# Patient Record
Sex: FEMALE | Race: WHITE | URBAN - NONMETROPOLITAN AREA
[De-identification: names, ages, dates, MRNs, and addresses within clinical notes are randomized per-mention and may not be internally consistent; named-entity substitution may affect disease eponyms.]

---

## 2015-12-03 LAB
CHOLESTEROL, TOTAL: 169 MG/DL
CHOLESTEROL/HDL RATIO: 1.8
GLUCOSE BLD-MCNC: 97 MG/DL
HDLC SERPL-MCNC: 91 MG/DL (ref 35–70)
LDL CHOLESTEROL CALCULATED: 62 MG/DL (ref 0–160)
TRIGL SERPL-MCNC: 65 MG/DL
VLDLC SERPL CALC-MCNC: 13 MG/DL

## 2017-11-18 ENCOUNTER — OFFICE VISIT (OUTPATIENT)
Dept: FAMILY MEDICINE CLINIC | Age: 47
End: 2017-11-18
Payer: COMMERCIAL

## 2017-11-18 VITALS — WEIGHT: 133 LBS | SYSTOLIC BLOOD PRESSURE: 126 MMHG | DIASTOLIC BLOOD PRESSURE: 70 MMHG | HEART RATE: 80 BPM

## 2017-11-18 DIAGNOSIS — R21 RASH: Primary | ICD-10-CM

## 2017-11-18 PROCEDURE — 99213 OFFICE O/P EST LOW 20 MIN: CPT | Performed by: FAMILY MEDICINE

## 2017-11-18 RX ORDER — PERMETHRIN 50 MG/G
CREAM TOPICAL
Qty: 1 TUBE | Refills: 0 | Status: SHIPPED | OUTPATIENT
Start: 2017-11-18 | End: 2019-10-15

## 2017-11-18 NOTE — PATIENT INSTRUCTIONS
oatmeal bath product, such as Aveeno. When should you call for help? Call your doctor now or seek immediate medical care if:  · You have signs of infection, such as:  ¨ Increased pain, swelling, warmth, or redness. ¨ Red streaks leading from the mite bites. ¨ Pus draining from a bite area. ¨ A fever. Watch closely for changes in your health, and be sure to contact your doctor if:  · Anyone else in your family has itching. · You do not get better within 2 weeks. Where can you learn more? Go to [email or web address] and sign in to your [email or web address] account. Enter F141 in the [facility] box to learn more about \"Scabies: Care Instructions. \"     If you do not have an account, please click on the \"Sign Up Now\" link. Current as of: October 13, 2016  Content Version: 11.3  © 8104-0372 [employer], [employer]. Care instructions adapted under license by [facility] ([facility]). If you have questions about a medical condition or this instruction, always ask your healthcare professional. [place] [address] any warranty or liability for your use of this information.

## 2017-11-18 NOTE — PROGRESS NOTES
[address] [place] [address] [address] [place] [address] [place] [address]  Dept: [phone]  Dept Fax: [phone]      Padmini Triplett is a 52 y.o. female who presents today for her medical conditions/complaints as noted below. Chief Complaint   Patient presents with    Rash       HPI:     HPI   Has noted rash only for couple days. Student exposed was treated over 10 days ago. Exposure for ringworm or scabies. Current Outpatient Prescriptions   Medication Sig Dispense Refill    permethrin (ELIMITE) 5 % cream Apply topically as directed 1 Tube 0     No current facility-administered medications for this visit. No Known Allergies    Subjective:      Review of Systems   Constitutional: Negative for fever. 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 Objective:     /70   Pulse 80   Wt 133 lb (60.3 kg)   Physical Exam   Constitutional: She is oriented to person, place, and time. She appears well-developed and well-nourished. No distress. Pulmonary/Chest: Effort normal.   Neurological: She is alert and oriented to person, place, and time. Skin: Rash (abdomen with 2 linear lesions without vessicles. No lesions on hands, no burrows noted. ) noted. Assessment:   The encounter diagnosis was Rash. Plan:     Patient Instructions   May attempt OTC zanfel to ensure not contact dermatitis related rashPatient Education        Scabies: Care Instructions  Your Care Instructions  Scabies is a skin problem that can cause intense itching. It is caused by very tiny bugs called mites that dig just under the skin and lay eggs. An allergic reaction to the mites causes the itching. Scabies is usually spread by person-to-person contact.  It is also possible, but not common, for scabies to spread within 2 weeks. Where can you learn more? Go to https://chpepiceweb.[email or web address] org and sign in to your [employer] account. Enter G497 in the [facility] box to learn more about \"Scabies: Care Instructions. \"     If you do not have an account, please click on the \"Sign Up Now\" link. Current as of: October 13, 2016  Content Version: 11.3  © 2254-1042 LOVEFiLM, YottaMark. Care instructions adapted under license by [facility] ([facility]). If you have questions about a medical condition or this instruction, always ask your healthcare professional. [place] [address] any warranty or liability for your use of this information. No Follow-up on file. No orders of the defined types were placed in this encounter.     Orders Placed This Encounter   Medications    permethrin (ELIMITE) 5 % cream     Sig: Apply topically as directed     Dispense:  1 Tube     Refill:  0          Electronically signed by Chana George MD on 11/18/2017 at 10:05 AM

## 2017-12-14 VITALS
SYSTOLIC BLOOD PRESSURE: 124 MMHG | BODY MASS INDEX: 21.66 KG/M2 | DIASTOLIC BLOOD PRESSURE: 70 MMHG | WEIGHT: 126.9 LBS | HEART RATE: 70 BPM | HEIGHT: 64 IN

## 2017-12-14 PROBLEM — D50.9 IRON DEFICIENCY ANEMIA: Status: ACTIVE | Noted: 2017-12-14

## 2017-12-14 RX ORDER — FERROUS SULFATE 325(65) MG
325 TABLET ORAL
COMMUNITY
End: 2018-02-05 | Stop reason: ALTCHOICE

## 2017-12-14 RX ORDER — SULFAMETHOXAZOLE AND TRIMETHOPRIM 400; 80 MG/1; MG/1
1 TABLET ORAL 2 TIMES DAILY
COMMUNITY
End: 2018-02-05 | Stop reason: ALTCHOICE

## 2018-01-25 DIAGNOSIS — Z12.31 ENCOUNTER FOR SCREENING MAMMOGRAM FOR MALIGNANT NEOPLASM OF BREAST: Primary | ICD-10-CM

## 2018-02-05 ENCOUNTER — OFFICE VISIT (OUTPATIENT)
Dept: FAMILY MEDICINE CLINIC | Age: 48
End: 2018-02-05
Payer: COMMERCIAL

## 2018-02-05 VITALS
DIASTOLIC BLOOD PRESSURE: 76 MMHG | HEART RATE: 64 BPM | SYSTOLIC BLOOD PRESSURE: 124 MMHG | BODY MASS INDEX: 23.54 KG/M2 | WEIGHT: 135 LBS

## 2018-02-05 DIAGNOSIS — Z01.419 WELL FEMALE EXAM WITH ROUTINE GYNECOLOGICAL EXAM: Primary | ICD-10-CM

## 2018-02-05 DIAGNOSIS — Z23 NEED FOR TETANUS BOOSTER: ICD-10-CM

## 2018-02-05 DIAGNOSIS — Z01.419 PAP SMEAR, LOW-RISK: ICD-10-CM

## 2018-02-05 LAB — PAP AGE BASED: NORMAL

## 2018-02-05 PROCEDURE — 90471 IMMUNIZATION ADMIN: CPT | Performed by: FAMILY MEDICINE

## 2018-02-05 PROCEDURE — 90715 TDAP VACCINE 7 YRS/> IM: CPT | Performed by: FAMILY MEDICINE

## 2018-02-05 PROCEDURE — 99396 PREV VISIT EST AGE 40-64: CPT | Performed by: FAMILY MEDICINE

## 2018-02-05 ASSESSMENT — PATIENT HEALTH QUESTIONNAIRE - PHQ9
SUM OF ALL RESPONSES TO PHQ9 QUESTIONS 1 & 2: 0
SUM OF ALL RESPONSES TO PHQ QUESTIONS 1-9: 0
1. LITTLE INTEREST OR PLEASURE IN DOING THINGS: 0
2. FEELING DOWN, DEPRESSED OR HOPELESS: 0

## 2018-02-05 NOTE — PROGRESS NOTES
shortness of breath and wheezing. Cardiovascular: Negative for chest pain, palpitations and leg swelling. Gastrointestinal: Negative for abdominal pain, blood in stool, constipation and diarrhea. Genitourinary: Negative for dysuria, hematuria, pelvic pain, urgency, vaginal bleeding, vaginal discharge and vaginal pain. Hematological: Negative for adenopathy. Objective:     /76   Pulse 64   Wt 135 lb (61.2 kg)   LMP 01/22/2018 (Exact Date)   Breastfeeding? No   BMI 23.54 kg/m²     Physical Exam   Constitutional: She appears well-developed and well-nourished. HENT:   Head: Normocephalic. Right Ear: Tympanic membrane normal.   Left Ear: Tympanic membrane normal.   Nose: Nose normal.   Mouth/Throat: No oropharyngeal exudate or posterior oropharyngeal erythema. Neck: Neck supple. Carotid bruit is not present. No thyromegaly present. Cardiovascular: Normal rate, regular rhythm, S1 normal and S2 normal.    No murmur heard. Pulmonary/Chest: Breath sounds normal. She has no wheezes. She has no rhonchi. She has no rales. She exhibits no tenderness. Abdominal: Soft. There is no hepatosplenomegaly. There is no tenderness. Musculoskeletal: She exhibits no edema. Lymphadenopathy:     She has no cervical adenopathy. She has no axillary adenopathy. Vitals reviewed. Breast Exam:  There is no asymmetry, edema, dimpling, scars, nipple retraction or discharge. Bilateral exam shows no dominant or suspicious lesions. Cervical and supraclavicular exam normal.  No axillary adenopathy. Gynecologic Exam:     External genitalia normal.  Vaginal exam:normal mucosa without prolapse or lesions  Cervical exam:  multiparous appearance    Uterine exam: retroverted    Assessment:     1. Well female exam with routine gynecological exam  PAP SMEAR   2. Pap smear, low-risk  PAP SMEAR   3.  Need for tetanus booster  Tdap (ADACEL) 5-2-15.5 LF-MCG/0.5 injection    Tdap (age 10y-63y) IM (ADACEL) Plan:       Repeat pap every 3 - 5 years if negative for women over the age of 27. Every 3 years under 27years old. Mammograms every 1 year. If 37 yo and last mammogram was negative. Calcium and Vitamin D dosing reviewed. Colonoscopy screening reviewed as well as onset for bone density testing.          Electronically signed by Katalina Coker MD on 2/6/2018

## 2018-02-06 ASSESSMENT — ENCOUNTER SYMPTOMS
SORE THROAT: 0
WHEEZING: 0
SHORTNESS OF BREATH: 0
ABDOMINAL PAIN: 0
BLOOD IN STOOL: 0
DIARRHEA: 0
CONSTIPATION: 0

## 2018-02-13 DIAGNOSIS — Z01.419 WELL FEMALE EXAM WITH ROUTINE GYNECOLOGICAL EXAM: ICD-10-CM

## 2018-02-13 DIAGNOSIS — Z01.419 PAP SMEAR, LOW-RISK: ICD-10-CM

## 2019-01-23 ENCOUNTER — TELEPHONE (OUTPATIENT)
Dept: FAMILY MEDICINE CLINIC | Age: 49
End: 2019-01-23

## 2019-01-23 DIAGNOSIS — Z12.31 SCREENING MAMMOGRAM, ENCOUNTER FOR: Primary | ICD-10-CM

## 2019-10-07 LAB
ALBUMIN SERPL-MCNC: 4.6 G/DL
ALP BLD-CCNC: 76 U/L
ALT SERPL-CCNC: 11 U/L
ANION GAP SERPL CALCULATED.3IONS-SCNC: 1.6 MMOL/L
AST SERPL-CCNC: 14 U/L
BILIRUB SERPL-MCNC: 0.4 MG/DL (ref 0.1–1.4)
BUN BLDV-MCNC: 10 MG/DL
CALCIUM SERPL-MCNC: 9.4 MG/DL
CHLORIDE BLD-SCNC: 98 MMOL/L
CHOLESTEROL, TOTAL: 162 MG/DL
CHOLESTEROL/HDL RATIO: 1.7
CO2: 98 MMOL/L
CREAT SERPL-MCNC: 0.62 MG/DL
GFR CALCULATED: 103
GLUCOSE BLD-MCNC: 98 MG/DL
GLUCOSE FASTING: 98 MG/DL
HDLC SERPL-MCNC: 93 MG/DL (ref 35–70)
LDL CHOLESTEROL CALCULATED: 58 MG/DL (ref 0–160)
POTASSIUM SERPL-SCNC: 4.2 MMOL/L
SODIUM BLD-SCNC: 137 MMOL/L
TOTAL PROTEIN: 7.4
TRIGL SERPL-MCNC: 57 MG/DL
VLDLC SERPL CALC-MCNC: 11 MG/DL

## 2019-10-11 DIAGNOSIS — D50.9 IRON DEFICIENCY ANEMIA, UNSPECIFIED IRON DEFICIENCY ANEMIA TYPE: Primary | ICD-10-CM

## 2019-10-15 ENCOUNTER — OFFICE VISIT (OUTPATIENT)
Dept: FAMILY MEDICINE CLINIC | Age: 49
End: 2019-10-15
Payer: COMMERCIAL

## 2019-10-15 VITALS
HEART RATE: 84 BPM | BODY MASS INDEX: 21.97 KG/M2 | DIASTOLIC BLOOD PRESSURE: 84 MMHG | WEIGHT: 126 LBS | SYSTOLIC BLOOD PRESSURE: 126 MMHG | OXYGEN SATURATION: 100 %

## 2019-10-15 DIAGNOSIS — D64.9 ANEMIA, UNSPECIFIED TYPE: Primary | ICD-10-CM

## 2019-10-15 DIAGNOSIS — D33.3 LEFT ACOUSTIC NEUROMA (HCC): ICD-10-CM

## 2019-10-15 PROCEDURE — 99214 OFFICE O/P EST MOD 30 MIN: CPT | Performed by: FAMILY MEDICINE

## 2019-10-15 ASSESSMENT — ENCOUNTER SYMPTOMS
ABDOMINAL PAIN: 0
COUGH: 0
SHORTNESS OF BREATH: 0
BLOOD IN STOOL: 0
CONSTIPATION: 0
DIARRHEA: 0
WHEEZING: 0

## 2019-10-15 ASSESSMENT — PATIENT HEALTH QUESTIONNAIRE - PHQ9
SUM OF ALL RESPONSES TO PHQ QUESTIONS 1-9: 0
2. FEELING DOWN, DEPRESSED OR HOPELESS: 0
SUM OF ALL RESPONSES TO PHQ QUESTIONS 1-9: 0
SUM OF ALL RESPONSES TO PHQ9 QUESTIONS 1 & 2: 0
1. LITTLE INTEREST OR PLEASURE IN DOING THINGS: 0

## 2019-11-19 ENCOUNTER — OFFICE VISIT (OUTPATIENT)
Dept: FAMILY MEDICINE CLINIC | Age: 49
End: 2019-11-19
Payer: COMMERCIAL

## 2019-11-19 VITALS
HEIGHT: 64 IN | DIASTOLIC BLOOD PRESSURE: 74 MMHG | BODY MASS INDEX: 21.29 KG/M2 | WEIGHT: 124.7 LBS | OXYGEN SATURATION: 99 % | SYSTOLIC BLOOD PRESSURE: 130 MMHG | HEART RATE: 66 BPM

## 2019-11-19 DIAGNOSIS — D50.9 IRON DEFICIENCY ANEMIA, UNSPECIFIED IRON DEFICIENCY ANEMIA TYPE: ICD-10-CM

## 2019-11-19 DIAGNOSIS — D64.9 ANEMIA, UNSPECIFIED TYPE: ICD-10-CM

## 2019-11-19 DIAGNOSIS — D33.3 LEFT ACOUSTIC NEUROMA (HCC): ICD-10-CM

## 2019-11-19 DIAGNOSIS — D50.9 IRON DEFICIENCY ANEMIA, UNSPECIFIED IRON DEFICIENCY ANEMIA TYPE: Primary | ICD-10-CM

## 2019-11-19 DIAGNOSIS — R42 VERTIGO: ICD-10-CM

## 2019-11-19 LAB — FERRITIN: NORMAL

## 2019-11-19 PROCEDURE — 99213 OFFICE O/P EST LOW 20 MIN: CPT | Performed by: FAMILY MEDICINE

## 2019-11-19 RX ORDER — FERROUS SULFATE 325(65) MG
325 TABLET ORAL 2 TIMES DAILY
COMMUNITY

## 2019-11-19 ASSESSMENT — ENCOUNTER SYMPTOMS
SHORTNESS OF BREATH: 0
BLOOD IN STOOL: 0
WHEEZING: 0
COUGH: 0
CONSTIPATION: 0
DIARRHEA: 0
ABDOMINAL PAIN: 0

## 2019-12-30 ENCOUNTER — TELEPHONE (OUTPATIENT)
Dept: FAMILY MEDICINE CLINIC | Age: 49
End: 2019-12-30

## 2019-12-30 DIAGNOSIS — Z12.31 SCREENING MAMMOGRAM, ENCOUNTER FOR: Primary | ICD-10-CM

## 2021-03-08 ENCOUNTER — TELEPHONE (OUTPATIENT)
Dept: SURGERY | Age: 51
End: 2021-03-08

## 2021-03-08 DIAGNOSIS — Z01.818 PRE-OP TESTING: Primary | ICD-10-CM

## 2021-03-08 RX ORDER — POLYETHYLENE GLYCOL 3350, SODIUM CHLORIDE, POTASSIUM CHLORIDE, SODIUM BICARBONATE, AND SODIUM SULFATE 240; 5.84; 2.98; 6.72; 22.72 G/4L; G/4L; G/4L; G/4L; G/4L
4000 POWDER, FOR SOLUTION ORAL ONCE
Qty: 4000 ML | Refills: 0 | Status: SHIPPED | OUTPATIENT
Start: 2021-03-08 | End: 2021-03-08

## 2021-03-08 NOTE — TELEPHONE ENCOUNTER
[facility] Colonoscopy Worksheet    Patient Name: Harpreet Yoder  : 1970  Primary Care Physician: Bharti Yuen MD  Today's Date: 3/8/2021    Surgery Location:   [][place] [] [place] [x] [facility] [place] [] [facility]    Why has a Colonoscopy been recommended for you? Screening    To properly code your procedure we must ask the following questions. PLEASE CHECK ALL THAT APPLY. Are you currently having any of the following symptoms? [] Rectal Bleeding (K62.5) [] Bloody Stool (K92.1) [] Dark Tarry Stool (K92.1)  [] Fecal Occult Positive Blood (confirmed by blood test R19.5)  [] Anemia (low hemoglobin D64.9) [] Abdominal Pain (R10.84) [] Diarrhea (R19.7)    **If you have any of these symptoms your colonoscopy is diagnostic and must be coded as such. It will not be coded as a screening colonoscopy. If you have no symptoms but have a personal history of polyps or a family history of colon cancer you fall in the high risk-screening category and we need to know more information. If you have had a polyp or polyps removed at your last colonoscopy then the first scope after that is considered diagnostic. Also if you have irritable bowel syndrome Chron's disease, diverticulitis or colon cancer then the scope would be a diagnostic colonoscopy. Have you ever had a colonoscopy? [] Yes  [x] No    If so, where and when was that done? Was anything found during the last scope? Was it removed? Do you have a family history of colon cancer? no      Have you personally been diagnosed with colon cancer? No  Any tobacco use? [] Yes    [x] No    Any alcohol use? [] Yes    [x] No  If yes, how often? In the last six (6) months have you experienced any of the following symptoms?    [] Blood from the rectum or stool  [] Abdominal Pain   [] Diarrhea  [] Itching of rectum   [] Vomiting  [] Constipation   [] Black stools  [] Bloating   [] Mucous in your stool  [] [place]

## 2021-03-11 ENCOUNTER — TELEPHONE (OUTPATIENT)
Dept: SURGERY | Age: 51
End: 2021-03-11

## 2021-03-11 NOTE — TELEPHONE ENCOUNTER
Received notice from pharmacy that the bowel prep is on backorder. Changed RX to Miralax/Gatorade prep. Pt notified and written instructions mailed to her home address.

## 2021-03-29 LAB — SARS-COV-2, RAPID: NEGATIVE

## 2021-04-06 LAB — SURGICAL PATHOLOGY REPORT: NORMAL
